# Patient Record
Sex: MALE | Race: BLACK OR AFRICAN AMERICAN | URBAN - METROPOLITAN AREA
[De-identification: names, ages, dates, MRNs, and addresses within clinical notes are randomized per-mention and may not be internally consistent; named-entity substitution may affect disease eponyms.]

---

## 2019-07-04 ENCOUNTER — OFFICE VISIT (OUTPATIENT)
Dept: URGENT CARE | Facility: URGENT CARE | Age: 29
End: 2019-07-04
Payer: COMMERCIAL

## 2019-07-04 VITALS
OXYGEN SATURATION: 98 % | HEART RATE: 80 BPM | WEIGHT: 145 LBS | TEMPERATURE: 99.3 F | BODY MASS INDEX: 20.76 KG/M2 | HEIGHT: 70 IN | SYSTOLIC BLOOD PRESSURE: 107 MMHG | DIASTOLIC BLOOD PRESSURE: 65 MMHG | RESPIRATION RATE: 23 BRPM

## 2019-07-04 DIAGNOSIS — Z11.3 SCREEN FOR STD (SEXUALLY TRANSMITTED DISEASE): Primary | ICD-10-CM

## 2019-07-04 LAB
ALBUMIN UR-MCNC: ABNORMAL MG/DL
APPEARANCE UR: CLEAR
BILIRUB UR QL STRIP: ABNORMAL
COLOR UR AUTO: YELLOW
GLUCOSE UR STRIP-MCNC: NEGATIVE MG/DL
HGB UR QL STRIP: NEGATIVE
KETONES UR STRIP-MCNC: ABNORMAL MG/DL
LEUKOCYTE ESTERASE UR QL STRIP: NEGATIVE
NITRATE UR QL: NEGATIVE
PH UR STRIP: 5.5 PH (ref 5–7)
RBC #/AREA URNS AUTO: ABNORMAL /HPF
SOURCE: ABNORMAL
SP GR UR STRIP: >1.03 (ref 1–1.03)
UROBILINOGEN UR STRIP-ACNC: 1 EU/DL (ref 0.2–1)
WBC #/AREA URNS AUTO: ABNORMAL /HPF

## 2019-07-04 PROCEDURE — 86803 HEPATITIS C AB TEST: CPT | Performed by: PHYSICIAN ASSISTANT

## 2019-07-04 PROCEDURE — 99203 OFFICE O/P NEW LOW 30 MIN: CPT | Performed by: PHYSICIAN ASSISTANT

## 2019-07-04 PROCEDURE — 36415 COLL VENOUS BLD VENIPUNCTURE: CPT | Performed by: PHYSICIAN ASSISTANT

## 2019-07-04 PROCEDURE — 87389 HIV-1 AG W/HIV-1&-2 AB AG IA: CPT | Performed by: PHYSICIAN ASSISTANT

## 2019-07-04 PROCEDURE — 87591 N.GONORRHOEAE DNA AMP PROB: CPT | Performed by: PHYSICIAN ASSISTANT

## 2019-07-04 PROCEDURE — 87340 HEPATITIS B SURFACE AG IA: CPT | Performed by: PHYSICIAN ASSISTANT

## 2019-07-04 PROCEDURE — 81001 URINALYSIS AUTO W/SCOPE: CPT | Performed by: PHYSICIAN ASSISTANT

## 2019-07-04 PROCEDURE — 87491 CHLMYD TRACH DNA AMP PROBE: CPT | Performed by: PHYSICIAN ASSISTANT

## 2019-07-04 PROCEDURE — 86706 HEP B SURFACE ANTIBODY: CPT | Performed by: PHYSICIAN ASSISTANT

## 2019-07-04 PROCEDURE — 86780 TREPONEMA PALLIDUM: CPT | Performed by: PHYSICIAN ASSISTANT

## 2019-07-04 ASSESSMENT — MIFFLIN-ST. JEOR: SCORE: 1630

## 2019-07-04 NOTE — PATIENT INSTRUCTIONS
(Z11.3) Screen for STD (sexually transmitted disease)  (primary encounter diagnosis)  Comment:   Plan: HIV Antigen Antibody Combo [UMZ0715], Hepatitis        B Surface Antibody [QZW0473], Hepatitis B         surface antigen [NAK309], Hepatitis C antibody         [TBS952], Treponema Abs w Reflex to RPR and         Titer [[id number]], Chlamydia trachomatis PCR         [BSG101], Neisseria gonorrhoeae PCR [[id number]],         UA with Microscopic reflex to Culture            Call for test results if you have not heard from us, just to make sure that you receive them.  Most of the tests should be back in 3 days.

## 2019-07-04 NOTE — PROGRESS NOTES
"Patient presents with:  Urgent Care: present for complete std screening - declined having symptoms.     SUBJECTIVE:   Santos Reyes is a 29 year old male presenting with a chief complaint of would like STD screening.  He had sexual intercourse with someone 7 months ago and his wife would like him tested.    Denies any symptoms.      No past medical history on file.   Denies any significant PMH      There is no problem list on file for this patient.    Social History     Tobacco Use     Smoking status: Former Smoker     Types: Cigarettes     Smokeless tobacco: Never Used   Substance Use Topics     Alcohol use: Not on file       ROS:  CONSTITUTIONAL:NEGATIVE for fever, chills, change in weight  INTEGUMENTARY/SKIN: NEGATIVE for worrisome rashes, moles or lesions  : negative for dysuria, hematuria, decreased urinary stream, erectile dysfunction  NEURO: NEGATIVE for weakness, dizziness or paresthesias  ENDOCRINE: NEGATIVE for temperature intolerance, skin/hair changes  HEME/ALLERGY/IMMUNE: NEGATIVE for bleeding problems  Review of systems negative except as stated above.    OBJECTIVE  :/65   Pulse 80   Temp 99.3  F (37.4  C) (Oral)   Resp 23   Ht 1.772 m (5' 9.75\")   Wt 65.8 kg (145 lb)   SpO2 98%   BMI 20.95 kg/m    GENERAL APPEARANCE: healthy, alert and no distress  ABDOMEN:  soft, nontender, no HSM or masses and bowel sounds normal  No CVAT  : deferred  SKIN: no suspicious lesions or rashes    (Z11.3) Screen for STD (sexually transmitted disease)  (primary encounter diagnosis)  Comment:   Plan: HIV Antigen Antibody Combo [[id number]], Hepatitis        B Surface Antibody [[id number]], Hepatitis B         surface antigen [PSK656], Hepatitis C antibody         [FIH088], Treponema Abs w Reflex to RPR and         Titer [[id number]], Chlamydia trachomatis PCR         [CJY130], Neisseria gonorrhoeae PCR [VMU9303],         UA with Microscopic reflex to Culture            Call for test results if you have not heard " from us, just to make sure that you receive them.  Most of the tests should be back in 3 days.

## 2019-07-05 LAB
HBV SURFACE AB SERPL IA-ACNC: 108.76 M[IU]/ML
HBV SURFACE AG SERPL QL IA: NONREACTIVE
HCV AB SERPL QL IA: NONREACTIVE
HIV 1+2 AB+HIV1 P24 AG SERPL QL IA: NONREACTIVE

## 2019-07-06 LAB — T PALLIDUM AB SER QL: NONREACTIVE

## 2019-07-07 ENCOUNTER — NURSE TRIAGE (OUTPATIENT)
Dept: NURSING | Facility: CLINIC | Age: 29
End: 2019-07-07

## 2019-07-07 LAB
C TRACH DNA SPEC QL NAA+PROBE: NEGATIVE
N GONORRHOEA DNA SPEC QL NAA+PROBE: NEGATIVE
SPECIMEN SOURCE: NORMAL
SPECIMEN SOURCE: NORMAL

## 2019-07-08 ENCOUNTER — TELEPHONE (OUTPATIENT)
Dept: URGENT CARE | Facility: URGENT CARE | Age: 29
End: 2019-07-08

## 2019-07-08 NOTE — TELEPHONE ENCOUNTER
Pt is calling asking about his lab results from 7/4/19.  Please review results, advise, and call pt back once they have been reviewed by provider.

## 2019-07-09 ENCOUNTER — TELEPHONE (OUTPATIENT)
Dept: URGENT CARE | Facility: URGENT CARE | Age: 29
End: 2019-07-09

## 2019-07-09 NOTE — TELEPHONE ENCOUNTER
Reason for Call:  Other call back    Detailed comments: Pt saw Starla 7/4/19 in .  He signed up for Mychart 7/9/19.  He would like his lab results from 7/4/19 to show up on his Mychart acct.    Phone Number Patient can be reached at: Home number on file [phone] (home)    Best Time: anytime    Can we leave a detailed message on this number? YES    Call taken on 7/9/2019 at 3:56 PM by JAMES MARTINEZ

## 2019-07-09 NOTE — TELEPHONE ENCOUNTER
Please inform patient of negative test results.  His test also shows immunity to Hepatitis B from previous immunization.      Starla BHANDARI, PA-C

## 2019-07-10 NOTE — TELEPHONE ENCOUNTER
"Please read all notes in chart before creating a new one.  I routed a note to nursing 7/9/19 at 2:34pm to inform patient of his negative results.  Patient was informed at 3:34 pm of his results.      I am not sure why patient called again at 3:58 pm but I have already reviewed and \"done-ed\" results so they should be available to my chart.      If they are not this is a technical issue that has nothing to do with the provider when labs have been marked as reviewed.   Please find out who patient can connect to IF his results are still not available in mychart.      Thanks  Starla BHANDARI, PA-C                  "

## 2019-11-05 ENCOUNTER — HEALTH MAINTENANCE LETTER (OUTPATIENT)
Age: 29
End: 2019-11-05

## 2020-11-22 ENCOUNTER — HEALTH MAINTENANCE LETTER (OUTPATIENT)
Age: 30
End: 2020-11-22

## 2021-09-19 ENCOUNTER — HEALTH MAINTENANCE LETTER (OUTPATIENT)
Age: 31
End: 2021-09-19

## 2022-01-09 ENCOUNTER — HEALTH MAINTENANCE LETTER (OUTPATIENT)
Age: 32
End: 2022-01-09

## 2022-11-21 ENCOUNTER — HEALTH MAINTENANCE LETTER (OUTPATIENT)
Age: 32
End: 2022-11-21

## 2023-01-08 ENCOUNTER — TELEPHONE (OUTPATIENT)
Dept: NURSING | Facility: CLINIC | Age: 33
End: 2023-01-08

## 2023-01-08 NOTE — TELEPHONE ENCOUNTER
Patient calling regarding Hep B labs from 2019. Reviewed results and note from Starla Garcia who indicates patient's results show previous immunization to Hep B. Patient expressed understanding with nothing further needed.     Erika Hernandez RN 01/08/23 12:55 AM   [facility] Triage Nurse Advisor

## 2023-04-16 ENCOUNTER — HEALTH MAINTENANCE LETTER (OUTPATIENT)
Age: 33
End: 2023-04-16

## 2024-06-22 ENCOUNTER — HEALTH MAINTENANCE LETTER (OUTPATIENT)
Age: 34
End: 2024-06-22
